# Patient Record
Sex: MALE | Race: BLACK OR AFRICAN AMERICAN | NOT HISPANIC OR LATINO | URBAN - METROPOLITAN AREA
[De-identification: names, ages, dates, MRNs, and addresses within clinical notes are randomized per-mention and may not be internally consistent; named-entity substitution may affect disease eponyms.]

---

## 2025-02-22 ENCOUNTER — OFFICE VISIT (OUTPATIENT)
Dept: URGENT CARE | Age: 25
End: 2025-02-22
Payer: COMMERCIAL

## 2025-02-22 VITALS
DIASTOLIC BLOOD PRESSURE: 86 MMHG | SYSTOLIC BLOOD PRESSURE: 132 MMHG | TEMPERATURE: 98.3 F | RESPIRATION RATE: 15 BRPM | OXYGEN SATURATION: 98 % | HEART RATE: 60 BPM | WEIGHT: 212.6 LBS

## 2025-02-22 DIAGNOSIS — H61.21 IMPACTED CERUMEN OF RIGHT EAR: Primary | ICD-10-CM

## 2025-02-22 ASSESSMENT — PATIENT HEALTH QUESTIONNAIRE - PHQ9
2. FEELING DOWN, DEPRESSED OR HOPELESS: NOT AT ALL
1. LITTLE INTEREST OR PLEASURE IN DOING THINGS: NOT AT ALL
SUM OF ALL RESPONSES TO PHQ9 QUESTIONS 1 AND 2: 0

## 2025-02-22 NOTE — PATIENT INSTRUCTIONS
You were seen at Urgent Care today and diagnosed with earwax buildup. Please treat as discussed. Monitor for red flags which we spoke about, If your symptoms change, worsen or become concerning in any way, please go to the emergency room immediately, otherwise you can followup with your PCP in 2-3 days as needed

## 2025-02-22 NOTE — PROGRESS NOTES
Subjective   Patient ID: Mac Magaña III is a 25 y.o. male. They present today with a chief complaint of Earache (RT ear x 2 days).    History of Present Illness  Patient is a very pleasant 25-year-old male with no reported past medical history presents urgent care today with a complaint of right ear feeling clogged.  He states his symptoms started yesterday.  He tried using some earwax removal solution but he states he feels as though that made it worse.  He denies any trauma, injury, mastoid tenderness, fever or headaches.  No other complaints or concerns mention at this time.      History provided by:  Patient  Earache         Past Medical History  Allergies as of 02/22/2025 - Reviewed 02/22/2025   Allergen Reaction Noted    Peanut oil Rash 09/24/2012    Penicillins Anaphylaxis, Hives, Nausea Only, Rash, and Unknown 09/21/2012    Milk containing products (dairy) Unknown 02/22/2025    Nut - unspecified Unknown 02/22/2025    Peanut Unknown 02/22/2025    Shellfish derived Unknown 02/22/2025    Tree nut Unknown 02/22/2025    Cat's claw (uncaria tomentosa) Rash 09/24/2012    Dog dander Rash 09/24/2012       (Not in a hospital admission)         History reviewed. No pertinent past medical history.    History reviewed. No pertinent surgical history.     reports that he has never smoked. He has never been exposed to tobacco smoke. He has never used smokeless tobacco. He reports that he does not currently use alcohol. He reports that he does not currently use drugs.    Review of Systems  Review of Systems   HENT:  Positive for ear pain.                                   Objective    Vitals:    02/22/25 1150   BP: 132/86   Pulse: 60   Resp: 15   Temp: 36.8 °C (98.3 °F)   TempSrc: Oral   SpO2: 98%   Weight: 96.4 kg (212 lb 9.6 oz)     No LMP for male patient.    Physical Exam  Vitals and nursing note reviewed.   Constitutional:       General: He is not in acute distress.     Appearance: Normal appearance. He is not  ill-appearing, toxic-appearing or diaphoretic.   HENT:      Head: Normocephalic and atraumatic.      Right Ear: There is impacted cerumen.      Left Ear: Tympanic membrane, ear canal and external ear normal.      Nose: Nose normal.      Mouth/Throat:      Mouth: Mucous membranes are moist.   Eyes:      Extraocular Movements: Extraocular movements intact.      Conjunctiva/sclera: Conjunctivae normal.      Pupils: Pupils are equal, round, and reactive to light.   Cardiovascular:      Rate and Rhythm: Normal rate and regular rhythm.      Pulses: Normal pulses.      Heart sounds: Normal heart sounds.   Pulmonary:      Effort: Pulmonary effort is normal. No respiratory distress.      Breath sounds: Normal breath sounds. No stridor. No wheezing, rhonchi or rales.   Chest:      Chest wall: No tenderness.   Musculoskeletal:         General: Normal range of motion.      Cervical back: Normal range of motion and neck supple.   Skin:     General: Skin is warm and dry.      Capillary Refill: Capillary refill takes less than 2 seconds.   Neurological:      General: No focal deficit present.      Mental Status: He is alert and oriented to person, place, and time.   Psychiatric:         Mood and Affect: Mood normal.         Behavior: Behavior normal.         Ear Cerumen Removal    Date/Time: 2/22/2025 12:10 PM    Performed by: OPAL Raymundo  Authorized by: OPAL Raymundo    Consent:     Consent obtained:  Verbal    Consent given by:  Patient    Risks, benefits, and alternatives were discussed: yes      Risks discussed:  Bleeding, dizziness, incomplete removal, infection, pain and TM perforation    Alternatives discussed:  No treatment, delayed treatment, alternative treatment, observation and referral  Universal protocol:     Procedure explained and questions answered to patient or proxy's satisfaction: yes      Relevant documents present and verified: yes      Site/side marked: yes      Immediately prior to  procedure, a time out was called: yes      Patient identity confirmed:  Verbally with patient  Procedure details:     Location:  L ear and R ear    Procedure type: irrigation      Procedure outcomes: cerumen removed    Post-procedure details:     Inspection:  No bleeding, ear canal clear and TM intact    Hearing quality:  Improved    Procedure completion:  Tolerated well, no immediate complications        Assessment/Plan   Allergies, medications, history, and pertinent labs/EKGs/Imaging reviewed by OPAL Raymundo.     Medical Decision Making    Patient is well appearing, afebrile, non toxic, not hypoxic, and appropriate for outpatient treatment and management at time of evaluation. Patient presents with right ear clogged.     Differential includes but not limited to: Cerumen impaction, otitis media, foreign body, TM rupture, other    On exam, left TM is noted to be intact and without signs of infection.  There is heavy cerumen but not impaction in this ear.  Right ear canal is impacted with earwax.  Eardrum is not visible.  Cerumen was flushed bilaterally without complication as documented in procedure note.  TMs were both visualized after procedure and noted to be intact with minimal to no cerumen remaining.  Patient notes significant proved in symptoms.    Recommended over-the-counter Debrox as needed.  Red flags and ER precautions discussed.  Patient voices understanding and is agreeable to this plan.  He was discharged stable condition.  All questions and concerns addressed.     Dictation software was used in the creation of this note which does not evaluate or correct for typographical, spelling, syntax or grammatical errors.    Orders and Diagnoses  There are no diagnoses linked to this encounter.    Medical Admin Record      Follow Up Instructions  No follow-ups on file.    Patient disposition: Home    Electronically signed by OPAL Raymundo  12:00 PM